# Patient Record
Sex: MALE | Race: OTHER | NOT HISPANIC OR LATINO | Employment: FULL TIME | URBAN - METROPOLITAN AREA
[De-identification: names, ages, dates, MRNs, and addresses within clinical notes are randomized per-mention and may not be internally consistent; named-entity substitution may affect disease eponyms.]

---

## 2021-12-24 ENCOUNTER — HOSPITAL ENCOUNTER (EMERGENCY)
Facility: OTHER | Age: 46
Discharge: HOME | End: 2021-12-24
Attending: EMERGENCY MEDICINE
Payer: OTHER GOVERNMENT

## 2021-12-24 VITALS
TEMPERATURE: 99 F | DIASTOLIC BLOOD PRESSURE: 104 MMHG | RESPIRATION RATE: 18 BRPM | HEIGHT: 72 IN | BODY MASS INDEX: 26.88 KG/M2 | HEART RATE: 87 BPM | SYSTOLIC BLOOD PRESSURE: 182 MMHG | WEIGHT: 198.44 LBS | OXYGEN SATURATION: 98 %

## 2021-12-24 DIAGNOSIS — U07.1 COVID-19 VIRUS INFECTION: Primary | ICD-10-CM

## 2021-12-24 LAB
CTP QC/QA: YES
GROUP A STREP, MOLECULAR: NEGATIVE
SARS-COV-2 RDRP RESP QL NAA+PROBE: POSITIVE

## 2021-12-24 PROCEDURE — U0002 COVID-19 LAB TEST NON-CDC: HCPCS | Performed by: EMERGENCY MEDICINE

## 2021-12-24 PROCEDURE — 99283 EMERGENCY DEPT VISIT LOW MDM: CPT

## 2021-12-24 PROCEDURE — 87651 STREP A DNA AMP PROBE: CPT | Performed by: EMERGENCY MEDICINE

## 2021-12-24 PROCEDURE — 25000003 PHARM REV CODE 250: Performed by: EMERGENCY MEDICINE

## 2021-12-24 RX ORDER — IBUPROFEN 600 MG/1
600 TABLET ORAL
Status: COMPLETED | OUTPATIENT
Start: 2021-12-24 | End: 2021-12-24

## 2021-12-24 RX ORDER — IBUPROFEN 600 MG/1
600 TABLET ORAL EVERY 8 HOURS PRN
Qty: 25 TABLET | Refills: 0 | Status: SHIPPED | OUTPATIENT
Start: 2021-12-24

## 2021-12-24 RX ADMIN — IBUPROFEN 600 MG: 600 TABLET, FILM COATED ORAL at 04:12

## 2021-12-24 RX ADMIN — ALUMINUM HYDROXIDE, MAGNESIUM HYDROXIDE, DIMETHICONE 50 ML: 200; 200; 20 LIQUID ORAL at 04:12

## 2021-12-24 NOTE — ED PROVIDER NOTES
Encounter Date: 12/24/2021       History     Chief Complaint   Patient presents with    COVID-19 Concerns     Sore throat x3 days.     46-year-old male presents to the ED complaining of URI symptoms for the past 3 days.  He initially had a sore throat with headache, subjective fevers, myalgias, and decreased appetite.  He also has had a mild cough but no shortness of breath or wheezing.  Today his sore throat worsened to where he could only drink liquids because solids were too painful.  Patient is from [place] and has had 2 doses of the [facility] vaccine.  No known sick contacts, no vomiting or diarrhea        Review of patient's allergies indicates:   Allergen Reactions    Asa [aspirin]     Penicillins     Promethazine     Sulfa (sulfonamide antibiotics)     Voltaren [diclofenac sodium]      No past medical history on file.  No past surgical history on file.  No family history on file.  Social History     Tobacco Use    Smoking status: Never Smoker   Substance Use Topics    Alcohol use: Never    Drug use: Never     Review of Systems   Constitutional: Positive for appetite change, chills and fatigue. Negative for fever.   HENT: Positive for sore throat. Negative for congestion.    Eyes: Negative for redness.   Respiratory: Negative for shortness of breath.    Cardiovascular: Negative for chest pain.   Gastrointestinal: Negative for abdominal pain.   Genitourinary: Negative for dysuria.   Musculoskeletal: Positive for myalgias.   Skin: Negative for rash.   Neurological: Negative for headaches.   Psychiatric/Behavioral: Negative for confusion.       Physical Exam     Initial Vitals [12/24/21 0218]   BP Pulse Resp Temp SpO2   (!) 182/104 87 18 99.3 °F (37.4 °C) 98 %      MAP       --         Physical Exam    Nursing note and vitals reviewed.  Constitutional: He is not diaphoretic. No distress.   HENT:   Head: Normocephalic and atraumatic.   Posterior pharynx erythema, no exudate or peritonsillar abscess    Eyes: Conjunctivae and EOM are normal. No scleral icterus.   Neck: Neck supple.   Normal range of motion.  Cardiovascular: Normal rate, regular rhythm, normal heart sounds and intact distal pulses.   No murmur heard.  Pulmonary/Chest: Breath sounds normal. No respiratory distress. He has no wheezes. He has no rhonchi. He has no rales.   Abdominal: Abdomen is soft. There is no abdominal tenderness. There is no rebound and no guarding.   Musculoskeletal:         General: No edema. Normal range of motion.      Cervical back: Normal range of motion and neck supple.     Neurological: He is alert and oriented to person, place, and time.   Skin: Skin is warm and dry.   Psychiatric: He has a normal mood and affect.         ED Course   Procedures  Labs Reviewed   SARS-COV-2 RDRP GENE - Abnormal; Notable for the following components:       Result Value    POC Rapid COVID Positive (*)     All other components within normal limits   GROUP A STREP, MOLECULAR          Imaging Results    None          Medications   GI cocktail (mylanta 30 mL, LIDOcaine 2 % viscous 10 mL, dicyclomine 10 mL) 50 mL (50 mLs Oral Given 12/24/21 0431)   ibuprofen tablet 600 mg (600 mg Oral Given 12/24/21 0431)     Medical Decision Making:   Initial Assessment:       46-year-old male with history of hypertension presents for evaluation of URI symptoms for the past 3 days.  He complains of a severe sore throat, headache, subjective fevers and chills, myalgias, decreased appetite and mild cough.  Sore throat worsened today and he is only able to drink liquids.  He denies any SOB, and no GI symptoms.  Patient is from [place] and received 2 doses of the [employer] vaccine.  On arrival patient with temp 99.3°, slightly elevated BP but normal O2 sat on room air.  Exam with posterior pharynx erythema but no sign of strep throat, with normal lung exam and no other concerning findings.  Rapid COVID-19 test was positive.  Patient treated with GI cocktail and  ibuprofen for his sore throat and myalgias with improvement. Patient with normal O2 sat at rest and with ambulation, no indication for steroids or admission. He is advised to continue supportive care including increased p.o. hydration, NSAIDs for myalgias and headache, topical anesthetic for sore throat.  Patient advised to quarantine per CDC criteria, return to the ED for any worsening SOB or other concerns.                        Clinical Impression:   Final diagnoses:  [U07.1] COVID-19 virus infection (Primary)          ED Disposition Condition    Discharge Stable        ED Prescriptions     Medication Sig Dispense Start Date End Date Auth. Provider    ibuprofen (ADVIL,MOTRIN) 600 MG tablet Take 1 tablet (600 mg total) by mouth every 8 (eight) hours as needed for Pain. 25 tablet 12/24/2021  Marek Ugalde MD    benzocaine-menthoL 15-3.6 mg Lozg 1 lozenge by Mucous Membrane route 4 (four) times daily as needed (Sore throat). 16 lozenge 12/24/2021  Marek Ugalde MD        Follow-up Information     Follow up With Specialties Details Why Contact Info    [facility] Dept Emergency Medicine Go to  [facility] symptoms worsen [id number] [facility] [id number] [phone]           Marek Ugalde MD  12/24/21 2734